# Patient Record
Sex: FEMALE
[De-identification: names, ages, dates, MRNs, and addresses within clinical notes are randomized per-mention and may not be internally consistent; named-entity substitution may affect disease eponyms.]

---

## 2017-04-19 ENCOUNTER — HOSPITAL ENCOUNTER (EMERGENCY)
Dept: HOSPITAL 82 - ED | Age: 37
LOS: 1 days | Discharge: HOME | DRG: 690 | End: 2017-04-20
Payer: SELF-PAY

## 2017-04-19 VITALS — HEIGHT: 62 IN | WEIGHT: 130.07 LBS | BODY MASS INDEX: 23.94 KG/M2

## 2017-04-19 DIAGNOSIS — F17.210: ICD-10-CM

## 2017-04-19 DIAGNOSIS — H01.006: ICD-10-CM

## 2017-04-19 DIAGNOSIS — N93.9: ICD-10-CM

## 2017-04-19 DIAGNOSIS — J45.909: ICD-10-CM

## 2017-04-19 DIAGNOSIS — I10: ICD-10-CM

## 2017-04-19 DIAGNOSIS — N39.0: Primary | ICD-10-CM

## 2017-04-19 LAB
AMORPH SED URNS QL MICRO: (no result) HPF
BACTERIA #/AREA URNS HPF: (no result) HPF
BASOPHILS NFR BLD AUTO: 0 % (ref 0–3)
BILIRUB UR QL STRIP.AUTO: NEGATIVE
CAOX CRY URNS QL MICRO: (no result) LPF
CLARITY UR: (no result)
COLOR UR AUTO: YELLOW
EOSINOPHIL NFR BLD AUTO: 2 % (ref 0–8)
ERYTHROCYTE [DISTWIDTH] IN BLOOD BY AUTOMATED COUNT: 17.3 % (ref 11.5–15.5)
GLUCOSE UR STRIP.AUTO-MCNC: NEGATIVE MG/DL
HCT VFR BLD AUTO: 37.6 % (ref 37–47)
HGB BLD-MCNC: 11.6 G/DL (ref 12–16)
HGB UR QL STRIP.AUTO: (no result)
IMM GRANULOCYTES NFR BLD: 0.2 % (ref 0–1)
KETONES UR STRIP.AUTO-MCNC: 15 MG/DL
LEUKOCYTE ESTERASE UR QL STRIP.AUTO: (no result)
LYMPHOCYTES NFR BLD: 25 % (ref 15–41)
MCH RBC QN AUTO: 25.1 PG  CALC (ref 26–32)
MCHC RBC AUTO-ENTMCNC: 30.9 G/L CALC (ref 32–36)
MCV RBC AUTO: 81.4 FL  CALC (ref 80–100)
MONOCYTES NFR BLD AUTO: 7 % (ref 2–13)
NEUTROPHILS # BLD AUTO: 3.96 THOU/UL (ref 2–7.15)
NEUTROPHILS NFR BLD AUTO: 66 % (ref 42–76)
NITRITE UR QL STRIP.AUTO: NEGATIVE
PH UR STRIP.AUTO: 6.5 [PH] (ref 4.5–8)
PLATELET # BLD AUTO: 271 THOU/UL (ref 130–400)
PROT UR QL STRIP.AUTO: (no result) MG/DL
RBC # BLD AUTO: 4.62 MILL/UL (ref 4.2–5.6)
RBC #/AREA URNS HPF: (no result) RBC/HPF (ref 0–5)
SERVICE CMNT 15-IMP: (no result) HPF
SP GR UR STRIP.AUTO: 1.02
SQUAMOUS URNS QL MICRO: (no result) EPI/HPF
UROBILINOGEN UR QL STRIP.AUTO: 0.2 E.U./DL
WBC #/AREA URNS HPF: (no result) WBC/HPF (ref 0–5)

## 2017-04-20 VITALS — SYSTOLIC BLOOD PRESSURE: 136 MMHG | DIASTOLIC BLOOD PRESSURE: 83 MMHG

## 2017-04-20 LAB
ALBUMIN SERPL-MCNC: 4.8 G/DL (ref 3.2–5)
ALP SERPL-CCNC: 92 U/L (ref 38–126)
ALT SERPL-CCNC: 29 U/L (ref 9–52)
ANION GAP SERPL CALCULATED.3IONS-SCNC: 17 MMOL/L
AST SERPL-CCNC: 17 U/L (ref 14–36)
BUN SERPL-MCNC: 14 MG/DL (ref 7–17)
BUN/CREAT SERPL: 24
CALCIUM SERPL-MCNC: 10 MG/DL (ref 8.4–10.2)
CHLORIDE SERPL-SCNC: 103 MMOL/L (ref 95–108)
CO2 SERPL-SCNC: 26 MMOL/L (ref 22–30)
CREAT SERPL-MCNC: 0.6 MG/DL (ref 0.5–1)
GLUCOSE SERPL-MCNC: 84 MG/DL (ref 65–105)
POTASSIUM SERPL-SCNC: 3.4 MMOL/L (ref 3.5–5.1)
PROT SERPL-MCNC: 8.6 G/DL (ref 6.3–8.2)
SODIUM SERPL-SCNC: 143 MMOL/L (ref 137–146)

## 2017-05-22 ENCOUNTER — HOSPITAL ENCOUNTER (EMERGENCY)
Dept: HOSPITAL 82 - ED | Age: 37
Discharge: LEFT BEFORE DISCHARGE | DRG: 951 | End: 2017-05-22
Payer: SELF-PAY

## 2017-05-22 DIAGNOSIS — Z91.19: Primary | ICD-10-CM

## 2017-06-21 ENCOUNTER — HOSPITAL ENCOUNTER (EMERGENCY)
Dept: HOSPITAL 82 - ED | Age: 37
Discharge: HOME | DRG: 690 | End: 2017-06-21
Payer: SELF-PAY

## 2017-06-21 VITALS — DIASTOLIC BLOOD PRESSURE: 74 MMHG | SYSTOLIC BLOOD PRESSURE: 114 MMHG

## 2017-06-21 VITALS — HEIGHT: 62 IN | BODY MASS INDEX: 22.31 KG/M2 | WEIGHT: 121.25 LBS

## 2017-06-21 DIAGNOSIS — R51: ICD-10-CM

## 2017-06-21 DIAGNOSIS — F12.10: ICD-10-CM

## 2017-06-21 DIAGNOSIS — N39.0: Primary | ICD-10-CM

## 2017-06-21 DIAGNOSIS — F15.10: ICD-10-CM

## 2017-06-21 LAB
BARBITURATES UR-MCNC: NEGATIVE UG/ML
BILIRUB UR QL STRIP.AUTO: NEGATIVE
CLARITY UR: (no result)
COCAINE UR-MCNC: NEGATIVE NG/ML
COLOR UR AUTO: YELLOW
GLUCOSE UR STRIP.AUTO-MCNC: NEGATIVE MG/DL
HGB UR QL STRIP.AUTO: NEGATIVE
KETONES UR STRIP.AUTO-MCNC: NEGATIVE MG/DL
LEUKOCYTE ESTERASE UR QL STRIP.AUTO: (no result)
METHADONE SERPL-MCNC: NEGATIVE NG/ML
NITRITE UR QL STRIP.AUTO: NEGATIVE
OXCYCODONE: NEGATIVE
PH UR STRIP.AUTO: 6 [PH] (ref 4.5–8)
PROT UR QL STRIP.AUTO: (no result) MG/DL
RBC #/AREA URNS HPF: (no result) RBC/HPF (ref 0–5)
SERVICE CMNT 15-IMP: (no result) HPF
SP GR UR STRIP.AUTO: 1.02
SQUAMOUS URNS QL MICRO: (no result) EPI/HPF
TETRAHYDROCANNABIONOL: POSITIVE
TRICYLIC ANTIDEPRESSANTS: NEGATIVE
UROBILINOGEN UR QL STRIP.AUTO: 0.2 E.U./DL
WBC #/AREA URNS HPF: (no result) WBC/HPF (ref 0–5)

## 2017-06-29 ENCOUNTER — HOSPITAL ENCOUNTER (EMERGENCY)
Dept: HOSPITAL 82 - ED | Age: 37
Discharge: HOME | DRG: 759 | End: 2017-06-29
Payer: SELF-PAY

## 2017-06-29 VITALS — BODY MASS INDEX: 28.4 KG/M2 | HEIGHT: 62 IN | WEIGHT: 154.32 LBS

## 2017-06-29 VITALS — DIASTOLIC BLOOD PRESSURE: 75 MMHG | SYSTOLIC BLOOD PRESSURE: 123 MMHG

## 2017-06-29 DIAGNOSIS — I10: ICD-10-CM

## 2017-06-29 DIAGNOSIS — J45.909: ICD-10-CM

## 2017-06-29 DIAGNOSIS — N76.0: Primary | ICD-10-CM

## 2017-06-29 DIAGNOSIS — F17.210: ICD-10-CM

## 2017-12-11 ENCOUNTER — HOSPITAL ENCOUNTER (EMERGENCY)
Dept: HOSPITAL 82 - ED | Age: 37
Discharge: HOME | DRG: 103 | End: 2017-12-11
Payer: SELF-PAY

## 2017-12-11 VITALS — DIASTOLIC BLOOD PRESSURE: 86 MMHG | SYSTOLIC BLOOD PRESSURE: 132 MMHG

## 2017-12-11 VITALS — WEIGHT: 140.21 LBS | HEIGHT: 60 IN | BODY MASS INDEX: 27.53 KG/M2

## 2017-12-11 DIAGNOSIS — I10: ICD-10-CM

## 2017-12-11 DIAGNOSIS — R51: Primary | ICD-10-CM

## 2017-12-11 DIAGNOSIS — J45.909: ICD-10-CM

## 2017-12-11 DIAGNOSIS — F17.210: ICD-10-CM

## 2018-10-17 ENCOUNTER — HOSPITAL ENCOUNTER (EMERGENCY)
Dept: HOSPITAL 82 - ED | Age: 38
Discharge: HOME | DRG: 897 | End: 2018-10-17
Payer: SELF-PAY

## 2018-10-17 VITALS — HEIGHT: 62 IN | BODY MASS INDEX: 26.37 KG/M2 | WEIGHT: 143.3 LBS

## 2018-10-17 VITALS — SYSTOLIC BLOOD PRESSURE: 131 MMHG | DIASTOLIC BLOOD PRESSURE: 74 MMHG
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
## 2018-10-17 DIAGNOSIS — F17.200: ICD-10-CM

## 2018-10-17 DIAGNOSIS — I10: ICD-10-CM
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
## 2018-10-17 DIAGNOSIS — F19.10: Primary | ICD-10-CM

## 2018-10-17 DIAGNOSIS — S90.821A: ICD-10-CM

## 2018-10-17 DIAGNOSIS — S80.861A: ICD-10-CM

## 2018-10-17 DIAGNOSIS — J45.909: ICD-10-CM

## 2018-10-17 DIAGNOSIS — S80.862A: ICD-10-CM

## 2018-10-17 DIAGNOSIS — S90.822A: ICD-10-CM

## 2018-10-17 LAB
ALBUMIN SERPL-MCNC: 4.5 G/DL (ref 3.2–5)
ALP SERPL-CCNC: 86 U/L (ref 38–126)
ANION GAP SERPL CALCULATED.3IONS-SCNC: 14 MMOL/L
AST SERPL-CCNC: 25 U/L (ref 14–36)
BARBITURATES UR-MCNC: NEGATIVE UG/ML
BASOPHILS NFR BLD AUTO: 1 % (ref 0–3)
BILIRUB UR QL STRIP.AUTO: NEGATIVE
BUN SERPL-MCNC: 15 MG/DL (ref 7–17)
BUN/CREAT SERPL: 28
CHLORIDE SERPL-SCNC: 103 MMOL/L (ref 95–108)
CLARITY UR: CLEAR
CO2 SERPL-SCNC: 27 MMOL/L (ref 22–30)
COCAINE UR-MCNC: NEGATIVE NG/ML
COLOR UR AUTO: YELLOW
CREAT SERPL-MCNC: 0.5 MG/DL (ref 0.5–1)
EOSINOPHIL NFR BLD AUTO: 3 % (ref 0–8)
ERYTHROCYTE [DISTWIDTH] IN BLOOD BY AUTOMATED COUNT: 20.5 % (ref 11.5–15.5)
GLUCOSE UR STRIP.AUTO-MCNC: NEGATIVE MG/DL
HCT VFR BLD AUTO: 34.4 % (ref 37–47)
HGB BLD-MCNC: 10.5 G/DL (ref 12–16)
HGB UR QL STRIP.AUTO: (no result)
IMM GRANULOCYTES NFR BLD: 0.2 % (ref 0–5)
KETONES UR STRIP.AUTO-MCNC: 40 MG/DL
LEUKOCYTE ESTERASE UR QL STRIP.AUTO: NEGATIVE
LYMPHOCYTES NFR BLD: 20 % (ref 15–41)
MCH RBC QN AUTO: 24.8 PG  CALC (ref 26–32)
MCHC RBC AUTO-ENTMCNC: 30.5 G/L CALC (ref 32–36)
MCV RBC AUTO: 81.1 FL  CALC (ref 80–100)
METHADONE SERPL-MCNC: NEGATIVE NG/ML
MONOCYTES NFR BLD AUTO: 8 % (ref 2–13)
NEUTROPHILS # BLD AUTO: 4.35 THOU/UL (ref 2–7.15)
NEUTROPHILS NFR BLD AUTO: 68 % (ref 42–76)
NITRITE UR QL STRIP.AUTO: NEGATIVE
OXCYCODONE: NEGATIVE
PH UR STRIP.AUTO: 6 [PH] (ref 4.5–8)
PLATELET # BLD AUTO: 212 THOU/UL (ref 130–400)
POTASSIUM SERPL-SCNC: 3.4 MMOL/L (ref 3.5–5.1)
PROT SERPL-MCNC: 7.8 G/DL (ref 6.3–8.2)
PROT UR QL STRIP.AUTO: NEGATIVE MG/DL
RBC # BLD AUTO: 4.24 MILL/UL (ref 4.2–5.6)
SODIUM SERPL-SCNC: 139 MMOL/L (ref 137–146)
SP GR UR STRIP.AUTO: 1.02
TETRAHYDROCANNABIONOL: POSITIVE
TRICYLIC ANTIDEPRESSANTS: NEGATIVE
UROBILINOGEN UR QL STRIP.AUTO: 1 E.U./DL

## 2019-08-26 ENCOUNTER — HOSPITAL ENCOUNTER (EMERGENCY)
Dept: HOSPITAL 82 - ED | Age: 39
Discharge: HOME | DRG: 897 | End: 2019-08-26
Payer: SELF-PAY

## 2019-08-26 VITALS — DIASTOLIC BLOOD PRESSURE: 93 MMHG | SYSTOLIC BLOOD PRESSURE: 138 MMHG

## 2019-08-26 VITALS — WEIGHT: 196.65 LBS | HEIGHT: 62 IN | BODY MASS INDEX: 36.19 KG/M2

## 2019-08-26 DIAGNOSIS — F17.200: ICD-10-CM

## 2019-08-26 DIAGNOSIS — F10.10: Primary | ICD-10-CM

## 2019-08-26 DIAGNOSIS — D64.9: ICD-10-CM

## 2019-08-26 DIAGNOSIS — I10: ICD-10-CM

## 2019-08-26 LAB
ALBUMIN SERPL-MCNC: 3.6 G/DL (ref 3.2–5)
ALP SERPL-CCNC: 57 U/L (ref 38–126)
ANION GAP SERPL CALCULATED.3IONS-SCNC: 9 MMOL/L
AST SERPL-CCNC: 25 U/L (ref 14–36)
BARBITURATES UR-MCNC: NEGATIVE UG/ML
BASOPHILS NFR BLD AUTO: 1 % (ref 0–3)
BILIRUB UR QL STRIP.AUTO: NEGATIVE
BUN SERPL-MCNC: 14 MG/DL (ref 7–17)
BUN/CREAT SERPL: 30
CHLORIDE SERPL-SCNC: 110 MMOL/L (ref 95–108)
CO2 SERPL-SCNC: 26 MMOL/L (ref 22–30)
COCAINE UR-MCNC: NEGATIVE NG/ML
COLOR UR AUTO: YELLOW
CREAT SERPL-MCNC: 0.5 MG/DL (ref 0.5–1)
EOSINOPHIL NFR BLD AUTO: 4 % (ref 0–8)
ERYTHROCYTE [DISTWIDTH] IN BLOOD BY AUTOMATED COUNT: 18.3 % (ref 11.5–15.5)
ETHANOL SERPL-MCNC: 75 MG/DL (ref 0–30)
GLUCOSE UR STRIP.AUTO-MCNC: NEGATIVE MG/DL
HCT VFR BLD AUTO: 27.8 % (ref 37–47)
HGB BLD-MCNC: 8 G/DL (ref 12–16)
HGB UR QL STRIP.AUTO: NEGATIVE
IMM GRANULOCYTES NFR BLD: 0.4 % (ref 0–5)
KETONES UR STRIP.AUTO-MCNC: NEGATIVE MG/DL
LEUKOCYTE ESTERASE UR QL STRIP.AUTO: (no result)
LYMPHOCYTES NFR BLD: 37 % (ref 15–41)
MCH RBC QN AUTO: 20.2 PG  CALC (ref 26–32)
MCHC RBC AUTO-ENTMCNC: 28.8 G/L CALC (ref 32–36)
MCV RBC AUTO: 70.2 FL  CALC (ref 80–100)
METHADONE SERPL-MCNC: NEGATIVE NG/ML
MONOCYTES NFR BLD AUTO: 9 % (ref 2–13)
NEUTROPHILS # BLD AUTO: 2.64 THOU/UL (ref 2–7.15)
NEUTROPHILS NFR BLD AUTO: 50 % (ref 42–76)
NITRITE UR QL STRIP.AUTO: NEGATIVE
OXCYCODONE: NEGATIVE
PH UR STRIP.AUTO: 6 [PH] (ref 4.5–8)
PLATELET # BLD AUTO: 234 THOU/UL (ref 130–400)
POTASSIUM SERPL-SCNC: 3.9 MMOL/L (ref 3.5–5.1)
PROT SERPL-MCNC: 6.6 G/DL (ref 6.3–8.2)
PROT UR QL STRIP.AUTO: NEGATIVE MG/DL
RBC # BLD AUTO: 3.96 MILL/UL (ref 4.2–5.6)
SODIUM SERPL-SCNC: 141 MMOL/L (ref 137–146)
SP GR UR STRIP.AUTO: 1.02
TETRAHYDROCANNABIONOL: NEGATIVE
TRICYLIC ANTIDEPRESSANTS: NEGATIVE
UROBILINOGEN UR QL STRIP.AUTO: 0.2 E.U./DL

## 2019-08-26 PROCEDURE — 0T9B70Z DRAINAGE OF BLADDER WITH DRAINAGE DEVICE, VIA NATURAL OR ARTIFICIAL OPENING: ICD-10-PCS | Performed by: EMERGENCY MEDICINE

## 2020-06-09 ENCOUNTER — HOSPITAL ENCOUNTER (EMERGENCY)
Dept: HOSPITAL 82 - ED | Age: 40
LOS: 1 days | Discharge: HOME | DRG: 897 | End: 2020-06-10
Payer: SELF-PAY

## 2020-06-09 DIAGNOSIS — I10: ICD-10-CM

## 2020-06-09 DIAGNOSIS — F10.129: Primary | ICD-10-CM

## 2020-06-09 DIAGNOSIS — F17.200: ICD-10-CM

## 2020-06-09 LAB
ALBUMIN SERPL-MCNC: 4.2 G/DL (ref 3.2–5)
ALP SERPL-CCNC: 63 U/L (ref 38–126)
ANION GAP SERPL CALCULATED.3IONS-SCNC: 15 MMOL/L
AST SERPL-CCNC: 19 U/L (ref 14–36)
BASOPHILS NFR BLD AUTO: 0 % (ref 0–3)
BILIRUB UR QL STRIP.AUTO: NEGATIVE
BUN SERPL-MCNC: 15 MG/DL (ref 7–17)
BUN/CREAT SERPL: 27
CHLORIDE SERPL-SCNC: 108 MMOL/L (ref 95–108)
CO2 SERPL-SCNC: 22 MMOL/L (ref 22–30)
COLOR UR AUTO: YELLOW
CREAT SERPL-MCNC: 0.6 MG/DL (ref 0.5–1)
EOSINOPHIL NFR BLD AUTO: 1 % (ref 0–8)
ERYTHROCYTE [DISTWIDTH] IN BLOOD BY AUTOMATED COUNT: 18.6 % (ref 11.5–15.5)
ETHANOL SERPL-MCNC: 265 MG/DL (ref 0–30)
GLUCOSE UR STRIP.AUTO-MCNC: NEGATIVE MG/DL
HCT VFR BLD AUTO: 31.2 % (ref 37–47)
HGB BLD-MCNC: 9.3 G/DL (ref 12–16)
HGB UR QL STRIP.AUTO: NEGATIVE
IMM GRANULOCYTES NFR BLD: 0.4 % (ref 0–5)
KETONES UR STRIP.AUTO-MCNC: NEGATIVE MG/DL
LEUKOCYTE ESTERASE UR QL STRIP.AUTO: NEGATIVE
LYMPHOCYTES NFR BLD: 23 % (ref 15–41)
MCH RBC QN AUTO: 21.9 PG  CALC (ref 26–32)
MCHC RBC AUTO-ENTMCNC: 29.8 G/DL CAL (ref 32–36)
MCV RBC AUTO: 73.4 FL  CALC (ref 80–100)
MONOCYTES NFR BLD AUTO: 8 % (ref 2–13)
NEUTROPHILS # BLD AUTO: 3.62 THOU/UL (ref 2–7.15)
NEUTROPHILS NFR BLD AUTO: 67 % (ref 42–76)
NITRITE UR QL STRIP.AUTO: NEGATIVE
PH UR STRIP.AUTO: 5 [PH] (ref 4.5–8)
PLATELET # BLD AUTO: 212 THOU/UL (ref 130–400)
POTASSIUM SERPL-SCNC: 3.3 MMOL/L (ref 3.5–5.1)
PROT SERPL-MCNC: 7.5 G/DL (ref 6.3–8.2)
PROT UR QL STRIP.AUTO: NEGATIVE MG/DL
RBC # BLD AUTO: 4.25 MILL/UL (ref 4.2–5.6)
SODIUM SERPL-SCNC: 142 MMOL/L (ref 137–146)
SP GR UR STRIP.AUTO: 1.02
UROBILINOGEN UR QL STRIP.AUTO: 0.2 E.U./DL

## 2020-06-09 PROCEDURE — S0164 INJECTION PANTROPRAZOLE: HCPCS

## 2020-06-10 VITALS — DIASTOLIC BLOOD PRESSURE: 67 MMHG | SYSTOLIC BLOOD PRESSURE: 101 MMHG

## 2021-09-08 ENCOUNTER — HOSPITAL ENCOUNTER (EMERGENCY)
Dept: HOSPITAL 82 - ED | Age: 41
LOS: 1 days | Discharge: HOME | DRG: 690 | End: 2021-09-09
Payer: SELF-PAY

## 2021-09-08 VITALS — BODY MASS INDEX: 20.28 KG/M2 | HEIGHT: 62 IN | WEIGHT: 110.23 LBS

## 2021-09-08 DIAGNOSIS — F17.200: ICD-10-CM

## 2021-09-08 DIAGNOSIS — N94.6: ICD-10-CM

## 2021-09-08 DIAGNOSIS — I10: ICD-10-CM

## 2021-09-08 DIAGNOSIS — J45.909: ICD-10-CM

## 2021-09-08 DIAGNOSIS — D25.9: ICD-10-CM

## 2021-09-08 DIAGNOSIS — N39.0: Primary | ICD-10-CM

## 2021-09-08 DIAGNOSIS — B96.20: ICD-10-CM

## 2021-09-08 LAB
ALBUMIN SERPL-MCNC: 4.1 G/DL (ref 3.2–5)
ALP SERPL-CCNC: 61 U/L (ref 38–126)
ANION GAP SERPL CALCULATED.3IONS-SCNC: 11 MMOL/L
AST SERPL-CCNC: 18 U/L (ref 14–36)
BASOPHILS NFR BLD AUTO: 1 % (ref 0–3)
BUN SERPL-MCNC: 17 MG/DL (ref 7–17)
BUN/CREAT SERPL: 27
CHLORIDE SERPL-SCNC: 107 MMOL/L (ref 95–108)
CO2 SERPL-SCNC: 25 MMOL/L (ref 22–30)
CREAT SERPL-MCNC: 0.6 MG/DL (ref 0.5–1)
EOSINOPHIL NFR BLD AUTO: 2 % (ref 0–8)
ERYTHROCYTE [DISTWIDTH] IN BLOOD BY AUTOMATED COUNT: 20 % (ref 11.5–15.5)
HCT VFR BLD AUTO: 28.3 % (ref 37–47)
HGB BLD-MCNC: 7.9 G/DL (ref 12–16)
IMM GRANULOCYTES NFR BLD: 0 % (ref 0–5)
LIPASE SERPL-CCNC: 130 U/L (ref 23–300)
LYMPHOCYTES NFR BLD: 15 % (ref 15–41)
MCH RBC QN AUTO: 18.5 PG  CALC (ref 26–32)
MCHC RBC AUTO-ENTMCNC: 27.9 G/DL CAL (ref 32–36)
MCV RBC AUTO: 66.3 FL  CALC (ref 80–100)
MONOCYTES NFR BLD AUTO: 8 % (ref 2–13)
NEUTROPHILS # BLD AUTO: 4.08 THOU/UL (ref 2–7.15)
NEUTROPHILS NFR BLD AUTO: 74 % (ref 42–76)
PLATELET # BLD AUTO: 251 THOU/UL (ref 130–400)
POTASSIUM SERPL-SCNC: 3.7 MMOL/L (ref 3.5–5.1)
PROT SERPL-MCNC: 7.1 G/DL (ref 6.3–8.2)
RBC # BLD AUTO: 4.27 MILL/UL (ref 4.2–5.6)
SODIUM SERPL-SCNC: 139 MMOL/L (ref 137–146)

## 2021-09-09 VITALS — DIASTOLIC BLOOD PRESSURE: 60 MMHG | SYSTOLIC BLOOD PRESSURE: 123 MMHG

## 2021-09-09 LAB
BACTERIA #/AREA URNS HPF: (no result) HPF
BILIRUB UR QL STRIP.AUTO: NEGATIVE
COLOR UR AUTO: YELLOW
GLUCOSE UR STRIP.AUTO-MCNC: NEGATIVE MG/DL
HGB UR QL STRIP.AUTO: (no result)
KETONES UR STRIP.AUTO-MCNC: NEGATIVE MG/DL
LEUKOCYTE ESTERASE UR QL STRIP.AUTO: NEGATIVE
NITRITE UR QL STRIP.AUTO: POSITIVE
PH UR STRIP.AUTO: 6 [PH] (ref 4.5–8)
PROT UR QL STRIP.AUTO: NEGATIVE MG/DL
RBC #/AREA URNS HPF: (no result) RBC/HPF (ref 0–5)
SP GR UR STRIP.AUTO: 1.02
SQUAMOUS URNS QL MICRO: (no result) EPI/HPF
UROBILINOGEN UR QL STRIP.AUTO: 0.2 E.U./DL
WBC #/AREA URNS HPF: (no result) WBC/HPF (ref 0–5)

## 2021-10-28 ENCOUNTER — HOSPITAL ENCOUNTER (OUTPATIENT)
Dept: HOSPITAL 82 - ED | Age: 41
Setting detail: OBSERVATION
LOS: 1 days | Discharge: LEFT BEFORE DISCHARGE | DRG: 312 | End: 2021-10-29
Attending: STUDENT IN AN ORGANIZED HEALTH CARE EDUCATION/TRAINING PROGRAM | Admitting: STUDENT IN AN ORGANIZED HEALTH CARE EDUCATION/TRAINING PROGRAM
Payer: SELF-PAY

## 2021-10-28 VITALS — HEIGHT: 62 IN | BODY MASS INDEX: 23.12 KG/M2 | WEIGHT: 125.66 LBS

## 2021-10-28 VITALS — SYSTOLIC BLOOD PRESSURE: 95 MMHG | DIASTOLIC BLOOD PRESSURE: 50 MMHG

## 2021-10-28 DIAGNOSIS — E86.0: ICD-10-CM

## 2021-10-28 DIAGNOSIS — F17.200: ICD-10-CM

## 2021-10-28 DIAGNOSIS — B96.20: ICD-10-CM

## 2021-10-28 DIAGNOSIS — I10: ICD-10-CM

## 2021-10-28 DIAGNOSIS — F15.10: ICD-10-CM

## 2021-10-28 DIAGNOSIS — R55: Primary | ICD-10-CM

## 2021-10-28 DIAGNOSIS — I95.9: ICD-10-CM

## 2021-10-28 DIAGNOSIS — F10.10: ICD-10-CM

## 2021-10-28 DIAGNOSIS — J45.909: ICD-10-CM

## 2021-10-28 DIAGNOSIS — D64.9: ICD-10-CM

## 2021-10-28 DIAGNOSIS — E87.2: ICD-10-CM

## 2021-10-28 DIAGNOSIS — F19.10: ICD-10-CM

## 2021-10-28 DIAGNOSIS — Z20.822: ICD-10-CM

## 2021-10-28 DIAGNOSIS — F12.10: ICD-10-CM

## 2021-10-28 DIAGNOSIS — N39.0: ICD-10-CM

## 2021-10-28 LAB
ALBUMIN SERPL-MCNC: 3.3 G/DL (ref 3.2–5)
ALP SERPL-CCNC: 82 U/L (ref 38–126)
ANION GAP SERPL CALCULATED.3IONS-SCNC: 10 MMOL/L
AST SERPL-CCNC: 17 U/L (ref 14–36)
BACTERIA #/AREA URNS HPF: (no result) HPF
BASOPHILS NFR BLD AUTO: 0 % (ref 0–3)
BILIRUB UR QL STRIP.AUTO: NEGATIVE
BUN SERPL-MCNC: 33 MG/DL (ref 7–17)
BUN/CREAT SERPL: 21
CHLORIDE SERPL-SCNC: 100 MMOL/L (ref 95–108)
CO2 SERPL-SCNC: 27 MMOL/L (ref 22–30)
COLOR UR AUTO: YELLOW
CREAT SERPL-MCNC: 1.6 MG/DL (ref 0.5–1)
EOSINOPHIL NFR BLD AUTO: 1 % (ref 0–8)
ERYTHROCYTE [DISTWIDTH] IN BLOOD BY AUTOMATED COUNT: 20.5 % (ref 11.5–15.5)
ETHANOL SERPL-MCNC: 0 MG/DL (ref 0–30)
GLUCOSE UR STRIP.AUTO-MCNC: NEGATIVE MG/DL
HCT VFR BLD AUTO: 25 % (ref 37–47)
HGB BLD-MCNC: 7.3 G/DL (ref 12–16)
HGB UR QL STRIP.AUTO: (no result)
IMM GRANULOCYTES NFR BLD: 0.3 % (ref 0–5)
KETONES UR STRIP.AUTO-MCNC: NEGATIVE MG/DL
LEUKOCYTE ESTERASE UR QL STRIP.AUTO: (no result)
LIPASE SERPL-CCNC: 54 U/L (ref 23–300)
LYMPHOCYTES NFR BLD: 4 % (ref 15–41)
MCH RBC QN AUTO: 18.8 PG  CALC (ref 26–32)
MCHC RBC AUTO-ENTMCNC: 29.2 G/DL CAL (ref 32–36)
MCV RBC AUTO: 64.3 FL  CALC (ref 80–100)
MONOCYTES NFR BLD AUTO: 9 % (ref 2–13)
MYOGLOBIN SERPL-MCNC: 40 NG/ML (ref 0–62)
NEUTROPHILS # BLD AUTO: 9.05 THOU/UL (ref 2–7.15)
NEUTROPHILS NFR BLD AUTO: 85 % (ref 42–76)
NITRITE UR QL STRIP.AUTO: POSITIVE
PH UR STRIP.AUTO: 6 [PH] (ref 4.5–8)
PLATELET # BLD AUTO: 227 THOU/UL (ref 130–400)
POTASSIUM SERPL-SCNC: 3.4 MMOL/L (ref 3.5–5.1)
PROT SERPL-MCNC: 6.7 G/DL (ref 6.3–8.2)
PROT UR QL STRIP.AUTO: 100 MG/DL
RBC # BLD AUTO: 3.89 MILL/UL (ref 4.2–5.6)
RBC #/AREA URNS HPF: (no result) RBC/HPF (ref 0–5)
SODIUM SERPL-SCNC: 134 MMOL/L (ref 137–146)
SP GR UR STRIP.AUTO: 1.02
SQUAMOUS URNS QL MICRO: (no result) EPI/HPF
UROBILINOGEN UR QL STRIP.AUTO: 1 E.U./DL

## 2021-10-28 PROCEDURE — G0378 HOSPITAL OBSERVATION PER HR: HCPCS

## 2021-10-28 NOTE — NUR
Admission Note
 
Report Given to:         STELLA BARRAZA
Transported by:           Wheelchair          X Stretcher
 
Transported with:        X Nurse     Transporter   X Patent IV    O2
                         X Cardiac Monitor
 
Location:                 ICU      X MS2

## 2021-10-28 NOTE — NUR
PT IN VIA EMS, AOX3, DROWSY WITH HYPOTENSION, HAS SMALL AREA TO NOSE THAT IS
BRUSIED FROM IMPACT OF PASSING OUT AT HOME

## 2021-10-28 NOTE — NUR
PT RESTING IN BED, STATES SHE IS STILL HUNGRY, PT PROVIDED WITH SNACKS. VOICES
NO NEEDS OR COMPLAINTS AT THIS TIME, CALL LIGHT IN REACH,CONTINUE TO MONITOR.

## 2021-10-28 NOTE — NUR
PT ARRIVED TO MS2 VIA STRETCHER, PT VERY LETHARGIC STATES UNABLE TO STAND AT
THIS TIME, ASSISTED FROM STRETCHER TO BED X3 ASSIST, PT TOLERATED WELL.
ORIENTED PT TO ROOM AND CALL LIGHT, DISCUSSED POC, PT ALERT AND ORIENTED X3,
TEDS APPLIED, NO EDEMA, SKIN INTACT. PT STATES SHE FELL AT HOME AND HIT HER
NOSE, NO INJURY NOTED. PT NOTED TO HAVE FEVER WILL MEDICATE WITH TYLENOL PT
ALSO C/O HEARTBURN, MD NOTIFIED PENDING ORDERS. ADMISSION ASSESSMENT
COMPLETED, CALL LIGHT IN REACH,CONTINUE TO MONITOR.

## 2021-10-29 VITALS — DIASTOLIC BLOOD PRESSURE: 54 MMHG | SYSTOLIC BLOOD PRESSURE: 93 MMHG

## 2021-10-29 VITALS — SYSTOLIC BLOOD PRESSURE: 97 MMHG | DIASTOLIC BLOOD PRESSURE: 56 MMHG

## 2021-10-29 VITALS — DIASTOLIC BLOOD PRESSURE: 75 MMHG | SYSTOLIC BLOOD PRESSURE: 127 MMHG

## 2021-10-29 VITALS — SYSTOLIC BLOOD PRESSURE: 98 MMHG | DIASTOLIC BLOOD PRESSURE: 57 MMHG

## 2021-10-29 LAB
ANION GAP SERPL CALCULATED.3IONS-SCNC: 10 MMOL/L
BUN SERPL-MCNC: 16 MG/DL (ref 7–17)
BUN/CREAT SERPL: 23
CHLORIDE SERPL-SCNC: 111 MMOL/L (ref 95–108)
CO2 SERPL-SCNC: 20 MMOL/L (ref 22–30)
CREAT SERPL-MCNC: 0.7 MG/DL (ref 0.5–1)
ERYTHROCYTE [DISTWIDTH] IN BLOOD BY AUTOMATED COUNT: 20.6 % (ref 11.5–15.5)
HCT VFR BLD AUTO: 20.5 % (ref 37–47)
HGB BLD-MCNC: 5.9 G/DL (ref 12–16)
MCH RBC QN AUTO: 19.2 PG  CALC (ref 26–32)
MCHC RBC AUTO-ENTMCNC: 28.8 G/DL CAL (ref 32–36)
MCV RBC AUTO: 66.8 FL  CALC (ref 80–100)
PLATELET # BLD AUTO: 200 THOU/UL (ref 130–400)
POTASSIUM SERPL-SCNC: 3.3 MMOL/L (ref 3.5–5.1)
RBC # BLD AUTO: 3.07 MILL/UL (ref 4.2–5.6)
SODIUM SERPL-SCNC: 138 MMOL/L (ref 137–146)

## 2021-10-29 NOTE — NUR
PT WALKING OUT OF ROOM STATING " I HAVE TO GO, I DONT WANT TO STAY." PT
EDUACTED ON RISK OF LEAVING AMA. PT STATES " I KNOW I DID IT BEFORE. I FEEL
ALOT BETTER. THANK YOU FOR YOUR SERVICES BUT I HAVE TO GO." #20G RAC AND LAC
REMOVED WITH CATH STILL INTACT. TELE MONITORING REMOVED.PT ASSITED TO LOBBY BY
NATALIIA MILLER. DHARA MORFIN AND DR LEVY INFORMED.

## 2021-10-29 NOTE — NUR
PT REFUSES LAB STICK. UNABLE TO OBTAINED FOR # 20 EMS. NEW #20G LAC STARTED,
SITE APEPARS HEALTHY AND PATENT.LABS COLLECTED AND SENT TO LAB.

## 2021-10-29 NOTE — NUR
NO NUMBER ON RECORD FOR PT. ATEMPTED TO CALL MUNIR (PERSON TO NOTIFY) TO
INFORM PT OF SCRIPT GORAN SENT TO [facility]. NO ANSWER. VOICE MAIL BOX FULL.

## 2021-10-29 NOTE — NUR
ATTEMPTED TO CALL FRIEND MUNIR [phone] TO INFORM PT OF LAB RESULTS. NO
ANSWER X2. VOICE MAIL BOX FULL.

## 2021-10-29 NOTE — NUR
1225- CRITICAL PROCAL RESULTING IN 1.070.
1333- HGB REUSLTING IN 5.9.
DR. LEVY AND SHELBIE,ANRP INFORMED. ATTEMPTED TO REACH BACK OUT TO MUNIR
DUE TO ONLY NUMBER PROVIDED TO INFORME PT OF LAB RESULTS. NO ANSWER. VOICE
MAIL LEFT.

## 2021-10-29 NOTE — NUR
PT RESTING IN BED, PT HAD A TEMP .3 MEDICATED WITH TYLENOL. PT VOICES NO
NEEDS OR COMPLAINTS AT THIS TIME, CALL LIGHT IN REACH,CONTINUE TO MONITOR.

## 2022-02-19 ENCOUNTER — HOSPITAL ENCOUNTER (EMERGENCY)
Dept: HOSPITAL 82 - ED | Age: 42
Discharge: HOME | DRG: 812 | End: 2022-02-19
Payer: COMMERCIAL

## 2022-02-19 VITALS — HEIGHT: 62 IN | WEIGHT: 127.87 LBS | BODY MASS INDEX: 23.53 KG/M2

## 2022-02-19 VITALS — DIASTOLIC BLOOD PRESSURE: 89 MMHG | SYSTOLIC BLOOD PRESSURE: 148 MMHG

## 2022-02-19 DIAGNOSIS — D64.9: Primary | ICD-10-CM

## 2022-02-19 DIAGNOSIS — I10: ICD-10-CM

## 2022-02-19 DIAGNOSIS — J45.909: ICD-10-CM

## 2022-02-19 DIAGNOSIS — F17.200: ICD-10-CM

## 2022-02-19 LAB
ALBUMIN SERPL-MCNC: 3.8 G/DL (ref 3.2–5)
ALP SERPL-CCNC: 62 U/L (ref 38–126)
ANION GAP SERPL CALCULATED.3IONS-SCNC: 11 MMOL/L
AST SERPL-CCNC: 18 U/L (ref 14–36)
BASOPHILS NFR BLD AUTO: 1 % (ref 0–3)
BILIRUB UR QL STRIP.AUTO: NEGATIVE
BUN SERPL-MCNC: 20 MG/DL (ref 7–17)
BUN/CREAT SERPL: 34
CHLORIDE SERPL-SCNC: 105 MMOL/L (ref 95–108)
CO2 SERPL-SCNC: 28 MMOL/L (ref 22–30)
COLOR UR AUTO: (no result)
CREAT SERPL-MCNC: 0.6 MG/DL (ref 0.5–1)
EOSINOPHIL NFR BLD AUTO: 3 % (ref 0–8)
ERYTHROCYTE [DISTWIDTH] IN BLOOD BY AUTOMATED COUNT: 18.8 % (ref 11.5–15.5)
GLUCOSE UR STRIP.AUTO-MCNC: NEGATIVE MG/DL
HCT VFR BLD AUTO: 27.5 % (ref 37–47)
HGB BLD-MCNC: 7.7 G/DL (ref 12–16)
HGB UR QL STRIP.AUTO: (no result)
IMM GRANULOCYTES NFR BLD: 0.2 % (ref 0–5)
KETONES UR STRIP.AUTO-MCNC: NEGATIVE MG/DL
LEUKOCYTE ESTERASE UR QL STRIP.AUTO: (no result)
LYMPHOCYTES NFR BLD: 22 % (ref 15–41)
MCH RBC QN AUTO: 19.2 PG  CALC (ref 26–32)
MCHC RBC AUTO-ENTMCNC: 28 G/DL CAL (ref 32–36)
MCV RBC AUTO: 68.6 FL  CALC (ref 80–100)
MONOCYTES NFR BLD AUTO: 10 % (ref 2–13)
MYOGLOBIN SERPL-MCNC: 20 NG/ML (ref 0–62)
NEUTROPHILS # BLD AUTO: 3.45 THOU/UL (ref 2–7.15)
NEUTROPHILS NFR BLD AUTO: 65 % (ref 42–76)
NITRITE UR QL STRIP.AUTO: POSITIVE
PH UR STRIP.AUTO: 7 [PH] (ref 4.5–8)
PLATELET # BLD AUTO: 225 THOU/UL (ref 130–400)
POTASSIUM SERPL-SCNC: 3.7 MMOL/L (ref 3.5–5.1)
PROT SERPL-MCNC: 6.9 G/DL (ref 6.3–8.2)
PROT UR QL STRIP.AUTO: NEGATIVE MG/DL
RBC # BLD AUTO: 4.01 MILL/UL (ref 4.2–5.6)
RBC #/AREA URNS HPF: (no result) RBC/HPF (ref 0–5)
SODIUM SERPL-SCNC: 140 MMOL/L (ref 137–146)
SP GR UR STRIP.AUTO: 1.01
UROBILINOGEN UR QL STRIP.AUTO: 0.2 E.U./DL
WBC #/AREA URNS HPF: (no result) WBC/HPF (ref 0–5)

## 2023-04-06 NOTE — NUR
PT RESTING IN SEMI FOWLERS POSITION. PT IS A/OX3. ASSESSMENT AND VITALS
COMEPLETED BP 98/57, HR 96, O2 100% ON ROOM AIR. REPSIRATIONS ARE EVEN AND
UNLABORED. LUNG SOUNDS ARE CLEAR. HEART RHYTHM NORMAL. BOWEL SOUNDS ARE
ACTIVE. #20G EMS RAC INFUSING WITH IVF PER ORDER, SITE REMAINS HEALTHY AND
PATENT. SKIN INTACT. PT DENIES OF ANY PAINS OR DISCOMFORTS. PT STATES THAT SHE
IS FEELING A LITTLE BETTER. PT DENIES OF ANY ADDITIONAL NEEDS AT THIS TIME.
ALL SAFETY PRECAUTIONS ARE IN PLACE WITH CALL LIGHT IN REACH. WILL CONTINUE TO
MONITOR. Resulted